# Patient Record
Sex: MALE | URBAN - METROPOLITAN AREA
[De-identification: names, ages, dates, MRNs, and addresses within clinical notes are randomized per-mention and may not be internally consistent; named-entity substitution may affect disease eponyms.]

---

## 2018-02-15 ENCOUNTER — THERAPY VISIT (OUTPATIENT)
Dept: PHYSICAL THERAPY | Facility: CLINIC | Age: 38
End: 2018-02-15
Payer: COMMERCIAL

## 2018-02-15 DIAGNOSIS — M54.50 BILATERAL LOW BACK PAIN WITHOUT SCIATICA: Primary | ICD-10-CM

## 2018-02-15 PROCEDURE — 97112 NEUROMUSCULAR REEDUCATION: CPT | Mod: GP | Performed by: PHYSICAL THERAPIST

## 2018-02-15 PROCEDURE — 97161 PT EVAL LOW COMPLEX 20 MIN: CPT | Mod: GP | Performed by: PHYSICAL THERAPIST

## 2018-02-15 PROCEDURE — 97110 THERAPEUTIC EXERCISES: CPT | Mod: GP | Performed by: PHYSICAL THERAPIST

## 2018-02-15 NOTE — PROGRESS NOTES
[facility] Initial Evaluation -- Lumbar    Date: February 15, 2018  Fredrick Cabrera is a 37 year old male with a Lumbar condition.   Referral: GP  Work mechanical stresses:  [employer] - operating machinery, prolonged standing, lifting/carrying, pushing/pulling, repetitive  Employment status:  Initially missed 2 days of work, none recently  Leisure mechanical stresses: not a regular exerciser  Functional disability score (ALIS/STarT Back):  See flowsheet  VAS score (0-10): 3/10  Patient goals/expectations:  Has never been to PT, exercises?    HISTORY:    Present symptoms: bilat LB, no buttock or leg pain  Pain quality (sharp/shooting/stabbing/aching/burning/cramping):  Tight, stiff, aching   Paresthesia (yes/no):  no    Present since (onset date): Late Dec 2017.     Symptoms (improving/unchanging/worsening):  Improved initially, currently unchanging.     Symptoms commenced as a result of: during a move into a house - moving beds   Condition occurred in the following environment:   At home     Symptoms at onset (back/thigh/leg): bilat LB  Constant symptoms (back/thigh/leg): none  Intermittent symptoms (back/thigh/leg): LB    Symptoms are made worse with the following: Sometimes Sitting (prolonged), Always Standing, Sometimes Walking (prolonged), Sometimes Lying, Time of day - Always as the day progresses and Other - Lifting and pushing pallet esme at work   Symptoms are made better with the following: Time of day - Always AM and Other - ice, heat stretching    Disturbed sleep (yes/no):  occas Sleeping postures (prone/sup/side R/L): side Lt    Previous episodes (0/1-5/6-10/11+): 1-5 Year of first episode: 18 yrs ago    Previous history: fell out of a wagon  Previous treatments: no treatment previously      Specific Questions:  Cough/Sneeze/Strain (pos/neg): neg  Bowel/Bladder (normal/abnormal): normal  Gait (normal/abnormal): normal  Medications (nil/NSAIDS/analg/steroids/anticoag/other):  OTC  analgesic and Other - migraine meds  Medical allergies:  NKA  General health (excellent/good/fair/poor):  goos  Pertinent medical history:  Smoking  Imaging (None/Xray/MRI/Other):  X-ray - negative  Recent or major surgery (yes/no):  Appendix 2002  Night pain (yes/no): none  Accidents (yes/no): no  Unexplained weight loss (yes/no): no  Barriers at home: none  Other red flags: none    EXAMINATION    Posture:   Sitting (good/fair/poor): poor  Standing (good/fair/poor):good  Lordosis (red/acc/normal): decr  Correction of posture (better/worse/no effect): better    Lateral Shift (right/left/nil): slight Lt  Relevant (yes/no):  ???  Other Observations:     Neurological:    Motor deficit:  Decr Single leg squat, toe raise and ant tib on Rt  Reflexes:  na  Sensory deficit:  na  Dural signs:  (+) slump Rt (lower leg and HS pain)    Movement Loss:   Olivier Mod Min Nil Pain   Flexion    X LB   Extension   X  LB   Side Gliding R   X  LBP   Side Gliding L    X      Test Movements:   During: produces, abolishes, increases, decreases, no effect, centralizing, peripheralizing   After: better, worse, no better, no worse, no effect, centralized, peripheralized    Pretest symptoms standing: tight LB and mild HS ache   Symptoms During Symptoms After ROM increased ROM decreased No Effect   FIS Produces    No Worse         Rep FIS Produces  LB and HS    No Worse      X   EIS Produces    No Worse         Rep EIS Produces  LB    Better    X - decr pain w/ SG Rt     Pretest symptoms lying: no pain lying    Symptoms During Symptoms After ROM increased ROM decreased No Effect   MONICA        Rep MONICA        EIL No Effect    No Effect         Rep EIL No Effect    No Effect    X - no pain w/ sideglide       Static Tests:  Sitting slouched:  Increased tightness Sitting erect:  decr tightness  Standing slouched na  Standing erect:  na  Lying prone in extension:  na Long sitting:  na    Other Tests:     Provisional Classification:  Derangement -  Bilateral, symmetrical, symptoms above knee    Principle of Management:  Education:  Posture - Neutral spine, Use of L-roll, affect of posture on LB and pain producing structures, no sitting in couch or recliner, no propping head and shlds up when lying down.   Equipment provided:  none  Mechanical therapy (Y/N):  y   Extension principle:  EIStdg 10 every 1-2 hours at work and press up 10x 5-6 x/day  Lateral Principle:    Flexion principle:    Other:      ASSESSMENT/PLAN:    Patient is a 37 year old male with lumbar complaints.    Patient has the following significant findings with corresponding treatment plan.                Diagnosis 1:  LBP  Pain -  manual therapy, education, directional preference exercise and home program  Decreased ROM/flexibility - manual therapy, therapeutic exercise and home program  Decreased strength - therapeutic exercise, therapeutic activities and home program  Decreased function - therapeutic activities and home program  Impaired posture - neuro re-education and home program    Therapy Evaluation Codes:   1) History comprised of:   Personal factors that impact the plan of care:      None.    Comorbidity factors that impact the plan of care are:      Smoking.     Medications impacting care: OTC anti-inflammatory.  2) Examination of Body Systems comprised of:   Body structures and functions that impact the plan of care:      Lumbar spine.   Activity limitations that impact the plan of care are:      Bending, Lifting, Standing, Walking, Working and Sleeping.  3) Clinical presentation characteristics are:   Stable/Uncomplicated.  4) Decision-Making    Low complexity using standardized patient assessment instrument and/or measureable assessment of functional outcome.  Cumulative Therapy Evaluation is: Low complexity.    Previous and current functional limitations:  (See Goal Flow Sheet for this information)    Short term and Long term goals: (See Goal Flow Sheet for this information)      Communication ability:  Patient appears to be able to clearly communicate and understand verbal and written communication and follow directions correctly.  Treatment Explanation - The following has been discussed with the patient:   RX ordered/plan of care  Anticipated outcomes  Possible risks and side effects  This patient would benefit from PT intervention to resume normal activities.   Rehab potential is good.    Frequency:  1 X week, once daily  Duration:  for 6 weeks  Discharge Plan:  Achieve all LTG.  Independent in home treatment program.  Reach maximal therapeutic benefit.    Please refer to the daily flowsheet for treatment today, total treatment time and time spent performing 1:1 timed codes.

## 2018-02-15 NOTE — LETTER
[facility] PHYSICAL THERAPY  [address]  [facility] [address]  [phone]    2018    Re: Fredrick Cabrera   :   1980  MRN:  [id number]   REFERRING PHYSICIAN:   Fady Potter    [facility] PHYSICAL THERAPY    Date of Initial Evaluation:  2/15/2018  Visits:    1  Reason for Referral:  Bilateral low back pain without sciatica  [facility] Initial Evaluation -- Lumbar  Date: February 15, 2018  Fredrick Cabrera is a 37 year old male with a Lumbar condition.   Referral: GP  Work mechanical stresses:  [employer] - operating machinery, prolonged standing, lifting/carrying, pushing/pulling, repetitive  Employment status:  Initially missed 2 days of work, none recently  Leisure mechanical stresses: not a regular exerciser  Functional disability score (ALIS/STarT Back):  See flowsheet  VAS score (0-10): 310  Patient goals/expectations:  Has never been to PT, exercises?    HISTORY:  Present symptoms: bilat LB, no buttock or leg pain  Pain quality (sharp/shooting/stabbing/aching/burning/cramping):  Tight, stiff, aching   Paresthesia (yes/no):  no  Present since (onset date): Late Dec 2017.     Symptoms (improving/unchanging/worsening):  Improved initially, currently unchanging.   Symptoms commenced as a result of: during a move into a house - moving beds   Condition occurred in the following environment:   At home   Symptoms at onset (back/thigh/leg): bilat LB  Constant symptoms (back/thigh/leg): none  Intermittent symptoms (back/thigh/leg): LB  Symptoms are made worse with the following: Sometimes Sitting (prolonged), Always Standing, Sometimes Walking (prolonged), Sometimes Lying, Time of day - Always as the day progresses and Other - Lifting and pushing pallet esme at work   Symptoms are made better with the following: Time of day - Always AM and Other - ice, heat stretching  Disturbed sleep (yes/no):   occas   Sleeping postures (prone/sup/side R/L): side Lt  Previous episodes (0/1-5/6-10/11+): 1-5   Year of first episode: 18 yrs ago  Previous history: fell out of a wagon  Previous treatments: no treatment previously      Re: Fredrick Cabrera   :   1980    Specific Questions:  Cough/Sneeze/Strain (pos/neg): neg  Bowel/Bladder (normal/abnormal): normal  Gait (normal/abnormal): normal  Medications (nil/NSAIDS/analg/steroids/anticoag/other):  OTC analgesic and Other - migraine meds  Medical allergies:  NKA  General health (excellent/good/fair/poor):  goos  Pertinent medical history:  Smoking  Imaging (None/Xray/MRI/Other):  X-ray - negative  Recent or major surgery (yes/no):  Appendix   Night pain (yes/no): none  Accidents (yes/no): no  Unexplained weight loss (yes/no): no  Barriers at home: none  Other red flags: none    EXAMINATION    Posture:   Sitting (good/fair/poor): poor  Standing (good/fair/poor):good  Lordosis (red/acc/normal): decr  Correction of posture (better/worse/no effect): better  Lateral Shift (right/left/nil): slight Lt  Relevant (yes/no):  ???  Other Observations:     Neurological:  Motor deficit:  Decr Single leg squat, toe raise and ant tib on Rt    Reflexes:  na  Sensory deficit:  na    Dural signs:  (+) slump Rt (lower leg and HS pain)    Movement Loss:   Olivier Mod Min Nil Pain   Flexion    X LB   Extension   X  LB   Side Gliding R   X  LBP   Side Gliding L    X      Test Movements:   During: produces, abolishes, increases, decreases, no effect, centralizing, peripheralizing   After: better, worse, no better, no worse, no effect, centralized, peripheralized            Re: Fredrick Cabrera   :   1980    Pretest symptoms standing: tight LB and mild HS ache   Symptoms During Symptoms After ROM increased ROM decreased No Effect   FIS Produces    No Worse         Rep FIS Produces  LB and HS    No Worse      X   EIS Produces    No Worse         Rep EIS Produces  LB    Better    X - decr pain  w/ SG Rt     Pretest symptoms lying: no pain lying    Symptoms During Symptoms After ROM increased ROM decreased No Effect   MONICA        Rep MONICA        EIL No Effect    No Effect         Rep EIL No Effect    No Effect    X - no pain w/ sideglide     Static Tests:  Sitting slouched:  Increased tightness Sitting erect:  decr tightness  Standing slouched na   Standing erect:  na  Lying prone in extension:  na  Long sitting:  na  Other Tests:   Provisional Classification:  Derangement - Bilateral, symmetrical, symptoms above knee  Principle of Management:  Education:  Posture - Neutral spine, Use of L-roll, affect of posture on LB and pain producing structures, no sitting in couch or recliner, no propping head and shlds up when lying down.     Equipment provided:  none  Mechanical therapy (Y/N):  y   Extension principle:  EIStdg 10 every 1-2 hours at work and press up 10x 5-6 x/day  Lateral Principle:    Flexion principle:      Other:      ASSESSMENT/PLAN:  Patient is a 37 year old male with lumbar complaints.    Patient has the following significant findings with corresponding treatment plan.                Diagnosis 1:  LBP  Pain -  manual therapy, education, directional preference exercise and home program  Decreased ROM/flexibility - manual therapy, therapeutic exercise and home program  Decreased strength - therapeutic exercise, therapeutic activities and home program  Decreased function - therapeutic activities and home program  Impaired posture - neuro re-education and home program  Re: Fredrick Cabrera   :   1980    Therapy Evaluation Codes:   1) History comprised of:   Personal factors that impact the plan of care:      None.    Comorbidity factors that impact the plan of care are:      Smoking.     Medications impacting care: OTC anti-inflammatory.  2) Examination of Body Systems comprised of:   Body structures and functions that impact the plan of care:      Lumbar spine.   Activity limitations that impact  the plan of care are:      Bending, Lifting, Standing, Walking, Working and Sleeping.  3) Clinical presentation characteristics are:   Stable/Uncomplicated.  4) Decision-Making    Low complexity using standardized patient assessment instrument and/or measureable assessment of functional outcome.  Cumulative Therapy Evaluation is: Low complexity.    Previous and current functional limitations:  (See Goal Flow Sheet for this information)    Short term and Long term goals: (See Goal Flow Sheet for this information)   Communication ability:  Patient appears to be able to clearly communicate and understand verbal and written communication and follow directions correctly.  Treatment Explanation - The following has been discussed with the patient:   RX ordered/plan of care, Anticipated outcomes, Possible risks and side effects    This patient would benefit from PT intervention to resume normal activities.   Rehab potential is good.  Frequency:  1 X week, once daily  Duration:  for 6 weeks  Discharge Plan:  Achieve all LTG.  Independent in home treatment program.  Reach maximal therapeutic benefit.    Thank you for your referral.    INQUIRIES        Therapist: Carin Grande, PT, Cert MDT  [facility] PH[facility]ICAL THERAPY  [address] [place] [address]  Phone: [phone]  Fax: [phone]

## 2018-02-26 ENCOUNTER — THERAPY VISIT (OUTPATIENT)
Dept: PHYSICAL THERAPY | Facility: CLINIC | Age: 38
End: 2018-02-26
Payer: COMMERCIAL

## 2018-02-26 DIAGNOSIS — M54.50 BILATERAL LOW BACK PAIN WITHOUT SCIATICA, UNSPECIFIED CHRONICITY: Primary | ICD-10-CM

## 2018-02-26 PROCEDURE — 97110 THERAPEUTIC EXERCISES: CPT | Mod: GP | Performed by: PHYSICAL THERAPIST

## 2018-02-26 PROCEDURE — 97530 THERAPEUTIC ACTIVITIES: CPT | Mod: GP | Performed by: PHYSICAL THERAPIST

## 2018-02-26 NOTE — PROGRESS NOTES
Subjective:  HPI                    Objective:  System    Physical Exam    General     ROS    Assessment/Plan:    DISCHARGE REPORT    Progress reporting period is from 2/15/2018 to 2/26/2018.       SUBJECTIVE  Subjective changes noted by patient:  Pt only has pain if he works overtime, slouches or turns in bed.   Current pain level is 0/10 Current Pain level: 0/10.     Previous pain level was  3/10 Initial Pain level: 3/10.   Changes in function:  Yes (See Goal flowsheet attached for changes in current functional level)  Adverse reaction to treatment or activity: None    OBJECTIVE  Changes noted in objective findings:  Yes, LSROM: wnl except extension mod loss. Sitting posture is fair in clinic BUT he is using L roll at home.    ASSESSMENT/PLAN  Updated problem list and treatment plan: Diagnosis 1:  LBP  Pain -  self management, education, directional preference exercise and home program  Decreased ROM/flexibility - home program  Decreased joint mobility - home program  Decreased function - home program  Impaired posture - home program  STG/LTGs have been met or progress has been made towards goals:  Yes (See Goal flow sheet completed today.)  Assessment of Progress: The patient's condition is improving.  Self Management Plans:  Patient is independent in a home treatment program.  Patient is independent in self management of symptoms.  I have re-evaluated this patient and find that the nature, scope, duration and intensity of the therapy is appropriate for the medical condition of the patient.  Fredrick continues to require the following intervention to meet STG and LTG's:  PT intervention is no longer required to meet STG/LTG.    Recommendations:  This patient is ready to be discharged from therapy and continue their home treatment program. He needs to work on regaining full ROM in extension.    Please refer to the daily flowsheet for treatment today, total treatment time and time spent performing 1:1 timed  codes.

## 2018-02-26 NOTE — LETTER
[facility] PHYSICAL THERAPY  [address]  [facility] [address]  [phone]    2018    Re: Fredrick Cabrera   :   1980  MRN:  [id number]   REFERRING PHYSICIAN:   Fady Potter    [facility] PHYSICAL THERAPY    Date of Initial Evaluation:  2/15/2018  Visits:  2  Reason for Referral:  Bilateral low back pain without sciatica, unspecified chronicity    DISCHARGE REPORT: Progress reporting period is from 2/15/2018 to 2018.       SUBJECTIVE  Subjective changes noted by patient:  Pt only has pain if he works overtime, slouches or turns in bed.   Current Pain level: 0/10.   Previous pain level was  3/10 Initial Pain level: 3/10.   Changes in function:  Yes (See Goal flowsheet attached for changes in current functional level). Adverse reaction to treatment or activity: None    OBJECTIVE  Changes noted in objective findings:  Yes, LSROM: wnl except extension mod loss. Sitting posture is fair in clinic BUT he is using L roll at home.    ASSESSMENT/PLAN  Updated problem list and treatment plan: Diagnosis 1:  LBP  Pain -  self management, education, directional preference exercise and home program  Decreased ROM/flexibility - home program  Decreased joint mobility - home program  Decreased function - home program  Impaired posture - home program  STG/LTGs have been met or progress has been made towards goals:  Yes (See Goal flow sheet completed today.)  Assessment of Progress: The patient's condition is improving.  Self Management Plans:  Patient is independent in a home treatment program.  Patient is independent in self management of symptoms.  I have re-evaluated this patient and find that the nature, scope, duration and intensity of the therapy is appropriate for the medical condition of the patient.  Fredrick continues to require the following intervention to meet STG and LTG's:  PT intervention is no longer required to meet  STG/LTG.                Re: Fredrick Cabrera   :   1980    Recommendations:  This patient is ready to be discharged from therapy and continue their home treatment program. He needs to work on regaining full ROM in extension.    Thank you for your referral.    INQUIRIES        Therapist:  Beronica De Jesus, PT, cert MDT  [facility] PHYSICAL THERAPY  [address] [facility] [address]  [facility] [address]  Phone: [phone]  Fax: [phone]